# Patient Record
Sex: MALE | Race: WHITE
[De-identification: names, ages, dates, MRNs, and addresses within clinical notes are randomized per-mention and may not be internally consistent; named-entity substitution may affect disease eponyms.]

---

## 2017-06-25 ENCOUNTER — HOSPITAL ENCOUNTER (EMERGENCY)
Dept: HOSPITAL 31 - C.ER | Age: 22
Discharge: HOME | End: 2017-06-25
Payer: SELF-PAY

## 2017-06-25 VITALS
HEART RATE: 86 BPM | TEMPERATURE: 98.1 F | SYSTOLIC BLOOD PRESSURE: 132 MMHG | RESPIRATION RATE: 20 BRPM | DIASTOLIC BLOOD PRESSURE: 76 MMHG | OXYGEN SATURATION: 98 %

## 2017-06-25 DIAGNOSIS — S39.012A: Primary | ICD-10-CM
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
## 2017-06-25 LAB
BACTERIA #/AREA URNS HPF: (no result) /[HPF]
BILIRUB UR-MCNC: NEGATIVE MG/DL
GLUCOSE UR STRIP-MCNC: NORMAL MG/DL
KETONES UR STRIP-MCNC: NEGATIVE MG/DL
LEUKOCYTE ESTERASE UR-ACNC: (no result) LEU/UL
PH UR STRIP: 7 [PH] (ref 5–8)
PROT UR STRIP-MCNC: NEGATIVE MG/DL
RBC # UR STRIP: NEGATIVE /UL
RBC #/AREA URNS HPF: 1 /HPF (ref 0–3)
SP GR UR STRIP: 1.02 (ref 1–1.03)
UROBILINOGEN UR-MCNC: 2 MG/DL (ref 0.2–1)
WBC #/AREA URNS HPF: < 1 /HPF (ref 0–5)

## 2017-06-25 NOTE — RAD
PROCEDURE:  Radiographs of the Lumbar Spine.



HISTORY:

pain







COMPARISON:

No prior.



FINDINGS:



BONES:

Normal alignment. No listhesis. No fracture.



DISC SPACES:

Unremarkable.



OTHER FINDINGS:

None.



IMPRESSION:

Unremarkable radiographs of the lumbar spine.

## 2017-06-25 NOTE — C.PDOC
History Of Present Illness





22 yo male w/o significant PMHx come in for evaluation of Right sided flank and 

lower back pain gradually developed for past 2 weeks "after lifted heavy box at 

work". Pt reports, pain is localized, worse with movement. Pt denies fever, 

chills, sore throat, abd. pain, V/D, UTi sx, saddle anesthesia, incontinence, 

denies weakness, sensory or vascular deficits to B/L LEs. Ambulate to ED, 

appears in pain.


Time Seen by Provider: 06/25/17 17:33


Chief Complaint (Nursing): Back Pain


History Per: Patient


Onset/Duration Of Symptoms: Gradual


Current Symptoms Are (Timing): Still Present





Past Medical History


Reviewed: Historical Data, Nursing Documentation, Vital Signs


Vital Signs: 


 Last Vital Signs











Temp  98.1 F   06/25/17 17:31


 


Pulse  86   06/25/17 17:31


 


Resp  20   06/25/17 17:31


 


BP  132/76   06/25/17 17:31


 


Pulse Ox  98   06/25/17 18:18














- Medical History


PMH: No Chronic Diseases


Surgical History: No Surg Hx


Family History: States: No Known Family Hx





- Social History


Hx Alcohol Use: No


Hx Substance Use: No





- Immunization History


Hx Tetanus Toxoid Vaccination: Yes


Hx Influenza Vaccination: No


Hx Pneumococcal Vaccination: Yes





Review Of Systems


Except As Marked, All Systems Reviewed And Found Negative.


Constitutional: Negative for: Fever, Chills


ENT: Negative for: Mouth Swelling, Throat Pain


Gastrointestinal: Negative for: Nausea, Vomiting, Abdominal Pain, Diarrhea


Genitourinary: Negative for: Dysuria, Incontinence


Musculoskeletal: Positive for: Back Pain.  Negative for: Neck Pain


Skin: Negative for: Rash, Bruising


Neurological: Negative for: Weakness, Numbness





Physical Exam





- Physical Exam


Appears: Well, Non-toxic, No Acute Distress


Skin: Normal Color, Warm, No Rash


Oral Mucosa: Moist


Throat: No Erythema


Neck: Supple


Gastrointestinal/Abdominal: Soft, No Tenderness, No Organomegaly, No Distention

, No Guarding


Back: No CVA Tenderness, No Vertebral Tenderness, Paraspinal Tenderness (

diffuse Right sided lumbar paraspinal tenderness, left flank tenderness wiht 

moderate muscle spasm.), No Straight Leg Raising


Extremity: No Pedal Edema, No Deformity


Neurological/Psych: Oriented x3, Normal Speech, Normal Motor, Normal Sensation, 

Normal Reflexes





ED Course And Treatment


O2 Sat by Pulse Oximetry: 98


Pulse Ox Interpretation: Normal





- Other Rad


  ** L-spine


X-Ray: Read By Radiologist


Interpretation: Accession No. : [id number].  Patient Name / ID : JAMAL INMAN  / [id number].  Exam Date : 06/25/2017 17:47:07 ( Approved ).  Study 

Comment :  Sex / Age : M  / 21Y.  Creator : Maynor Blanton MD.  Dictator : 

Maynor Blanton MD.   :  Approver : Manyor Blanton MD.  Approver2 

:  Report Date : 06/25/2017 17:57:31.  My Comment :  ***************************

********************************************************.  PROCEDURE:  

Radiographs of the Lumbar Spine.  HISTORY:  pain.  COMPARISON:  No prior.  

FINDINGS:  BONES:  Normal alignment. No listhesis. No fracture.  DISC SPACES:  

Unremarkable.  OTHER FINDINGS:  None.  IMPRESSION:  Unremarkable radiographs of 

the lumbar spine.


Progress Note: On re-evaluation, pt is afebrile, hemodynamicaly stable.  Non-

toxic. Ambulatory in Ed with stable.  Neck: (-) meningeal sign.  ENT:no acute 

findings.  Lungs: CTA B/L, BS equal B/L.  Abd: benign, (-) guarding, (-) 

rebound. back: (-) CVA tenderness. L-spine xrya review and appears normal. UA- 

normal study. Pt has clinical findings c/w Right lumbar spine strain. Pt was 

advised on course of ds.  ref. to f/u with PMD in 2-3 days for re-eval .  

return to ED if any worsening or new changes.





Disposition


Counseled Patient/Family Regarding: Studies Performed, Diagnosis, Need For 

Followup, Rx Given





- Disposition


Referrals: 


[facility] [Outside]


Disposition: HOME/ ROUTINE


Disposition Time: 18:16


Condition: STABLE


Additional Instructions: 


NO HEAVY LIFTING, BENDING FOR 1 WEEK





NO PHYSICAL ACTIVITY FOR 1WEEL





TAKE MEDICATION AS NEED AS PRESCRIBED FOR PAIN





FOLLOW UP WITH PMD IN 2-3 DAYS FOR RE-EVALUATION.


RETURN TO ED IF ANY WORSENING OR NEW CHANGES.


Prescriptions: 


Methocarbamol [Robaxin] 500 mg PO TID #14 tab


traMADol [Ultram] 50 mg PO TID #7 tab


Instructions:  Muscle Spasm (ED), Back Pain (ED)


Forms:  Work Excuse


Print Language: Divehi





- Clinical Impression


Clinical Impression: 


 Low back strain

## 2019-02-18 ENCOUNTER — HOSPITAL ENCOUNTER (OUTPATIENT)
Dept: HOSPITAL 14 - H.ENDO | Age: 24
Discharge: HOME | End: 2019-02-18
Attending: INTERNAL MEDICINE
Payer: COMMERCIAL

## 2019-02-18 VITALS
OXYGEN SATURATION: 99 % | DIASTOLIC BLOOD PRESSURE: 48 MMHG | SYSTOLIC BLOOD PRESSURE: 101 MMHG | HEART RATE: 59 BPM | RESPIRATION RATE: 20 BRPM

## 2019-02-18 VITALS — BODY MASS INDEX: 20.4 KG/M2

## 2019-02-18 VITALS — TEMPERATURE: 97.8 F

## 2019-02-18 DIAGNOSIS — K29.80: ICD-10-CM

## 2019-02-18 DIAGNOSIS — R10.13: ICD-10-CM

## 2019-02-18 DIAGNOSIS — K31.89: Primary | ICD-10-CM

## 2019-02-18 PROCEDURE — 43239 EGD BIOPSY SINGLE/MULTIPLE: CPT

## 2019-02-18 PROCEDURE — 88305 TISSUE EXAM BY PATHOLOGIST: CPT
